# Patient Record
Sex: FEMALE | Race: BLACK OR AFRICAN AMERICAN | Employment: UNEMPLOYED | URBAN - METROPOLITAN AREA
[De-identification: names, ages, dates, MRNs, and addresses within clinical notes are randomized per-mention and may not be internally consistent; named-entity substitution may affect disease eponyms.]

---

## 2021-05-22 ENCOUNTER — APPOINTMENT (OUTPATIENT)
Dept: GENERAL RADIOLOGY | Age: 26
End: 2021-05-22
Attending: EMERGENCY MEDICINE
Payer: COMMERCIAL

## 2021-05-22 ENCOUNTER — APPOINTMENT (OUTPATIENT)
Dept: CT IMAGING | Age: 26
End: 2021-05-22
Attending: EMERGENCY MEDICINE
Payer: COMMERCIAL

## 2021-05-22 ENCOUNTER — HOSPITAL ENCOUNTER (EMERGENCY)
Age: 26
Discharge: HOME | End: 2021-05-22
Attending: EMERGENCY MEDICINE
Payer: COMMERCIAL

## 2021-05-22 VITALS
DIASTOLIC BLOOD PRESSURE: 82 MMHG | SYSTOLIC BLOOD PRESSURE: 138 MMHG | OXYGEN SATURATION: 100 % | RESPIRATION RATE: 15 BRPM | HEART RATE: 91 BPM

## 2021-05-22 DIAGNOSIS — F10.920 ALCOHOLIC INTOXICATION WITHOUT COMPLICATION (HCC): Primary | ICD-10-CM

## 2021-05-22 LAB
ALBUMIN SERPL-MCNC: 3.9 G/DL (ref 3.5–5)
ALBUMIN/GLOB SERPL: 0.8 {RATIO} (ref 1.1–2.2)
ALP SERPL-CCNC: 103 U/L (ref 45–117)
ALT SERPL-CCNC: 31 U/L (ref 12–78)
ANION GAP SERPL CALC-SCNC: 13 MMOL/L (ref 5–15)
AST SERPL-CCNC: 30 U/L (ref 15–37)
BASOPHILS # BLD: 0.1 K/UL (ref 0–0.1)
BASOPHILS NFR BLD: 1 % (ref 0–1)
BILIRUB SERPL-MCNC: 0.4 MG/DL (ref 0.2–1)
BUN SERPL-MCNC: 11 MG/DL (ref 6–20)
BUN/CREAT SERPL: 11 (ref 12–20)
CALCIUM SERPL-MCNC: 8.7 MG/DL (ref 8.5–10.1)
CHLORIDE SERPL-SCNC: 105 MMOL/L (ref 97–108)
CO2 SERPL-SCNC: 23 MMOL/L (ref 21–32)
CREAT SERPL-MCNC: 0.98 MG/DL (ref 0.55–1.02)
DIFFERENTIAL METHOD BLD: ABNORMAL
EOSINOPHIL # BLD: 0.2 K/UL (ref 0–0.4)
EOSINOPHIL NFR BLD: 2 % (ref 0–7)
ERYTHROCYTE [DISTWIDTH] IN BLOOD BY AUTOMATED COUNT: 14.4 % (ref 11.5–14.5)
ETHANOL SERPL-MCNC: 185 MG/DL
GLOBULIN SER CALC-MCNC: 4.8 G/DL (ref 2–4)
GLUCOSE SERPL-MCNC: 140 MG/DL (ref 65–100)
HCG SERPL QL: NEGATIVE
HCT VFR BLD AUTO: 39.2 % (ref 35–47)
HGB BLD-MCNC: 12 G/DL (ref 11.5–16)
IMM GRANULOCYTES # BLD AUTO: 0 K/UL (ref 0–0.04)
IMM GRANULOCYTES NFR BLD AUTO: 0 % (ref 0–0.5)
LYMPHOCYTES # BLD: 4 K/UL (ref 0.8–3.5)
LYMPHOCYTES NFR BLD: 40 % (ref 12–49)
MCH RBC QN AUTO: 22.5 PG (ref 26–34)
MCHC RBC AUTO-ENTMCNC: 30.6 G/DL (ref 30–36.5)
MCV RBC AUTO: 73.5 FL (ref 80–99)
MONOCYTES # BLD: 0.3 K/UL (ref 0–1)
MONOCYTES NFR BLD: 3 % (ref 5–13)
NEUTS SEG # BLD: 5.3 K/UL (ref 1.8–8)
NEUTS SEG NFR BLD: 54 % (ref 32–75)
NRBC # BLD: 0 K/UL (ref 0–0.01)
NRBC BLD-RTO: 0 PER 100 WBC
PLATELET # BLD AUTO: 215 K/UL (ref 150–400)
POTASSIUM SERPL-SCNC: 3.6 MMOL/L (ref 3.5–5.1)
PROT SERPL-MCNC: 8.7 G/DL (ref 6.4–8.2)
RBC # BLD AUTO: 5.33 M/UL (ref 3.8–5.2)
SODIUM SERPL-SCNC: 141 MMOL/L (ref 136–145)
WBC # BLD AUTO: 9.9 K/UL (ref 3.6–11)

## 2021-05-22 PROCEDURE — 84703 CHORIONIC GONADOTROPIN ASSAY: CPT

## 2021-05-22 PROCEDURE — 80053 COMPREHEN METABOLIC PANEL: CPT

## 2021-05-22 PROCEDURE — 85025 COMPLETE CBC W/AUTO DIFF WBC: CPT

## 2021-05-22 PROCEDURE — 82077 ASSAY SPEC XCP UR&BREATH IA: CPT

## 2021-05-22 PROCEDURE — 70450 CT HEAD/BRAIN W/O DYE: CPT

## 2021-05-22 PROCEDURE — 36415 COLL VENOUS BLD VENIPUNCTURE: CPT

## 2021-05-22 PROCEDURE — 99285 EMERGENCY DEPT VISIT HI MDM: CPT

## 2021-05-22 PROCEDURE — 93005 ELECTROCARDIOGRAM TRACING: CPT

## 2021-05-22 PROCEDURE — 71045 X-RAY EXAM CHEST 1 VIEW: CPT

## 2021-05-22 NOTE — ED NOTES
Pt A and O x 3. Pt sitting up in the bed speaking w/ this RN and  at bedside. Pt provided w/ ginger ale and assortment of snacks. Pt testing upright in a position of comfort and in no acute distress. RR even and unlabored. Skin warm and dry. Call bell in reach. Cardiac Monitor x 3.

## 2021-05-22 NOTE — ED PROVIDER NOTES
EMERGENCY DEPARTMENT HISTORY AND PHYSICAL EXAM      Date: 5/22/2021  Patient Name: Jarrod Knight    History of Presenting Illness     Chief Complaint   Patient presents with    Alcohol intoxication       History Provided By: Patient    HPI: Jarrod Knight, 27 y.o. female without known medical history presents to the ED with cc of unresponsiveness and alcohol intoxication. Patient was found in the bathroom of a bar, sister at bedside states that they were bar hopping and that patient went to the bathroom and when she did not return she went to check on her and found her passed out on the floor. EMS called and point-of-care glucose 150. Patient resting in bed with eyes closed, denies pain, will only briefly open eyes. She will not talk to me. She does not her head yes when asked if she was drinking alcohol and shakes her head no when asked if she was in any pain or if she did any other drugs. Otherwise remainder of HPI and ROS limited given patient's severely intoxicated state. There are no other complaints, changes, or physical findings at this time. PCP: None    No current facility-administered medications on file prior to encounter. No current outpatient medications on file prior to encounter. Past History     Past Medical History:  No significant past medical history    Past Surgical History:  No significant past surgical history    Family History:  Reviewed, noncontributory    Social History:  Positive alcohol use, denies drug use    Allergies:  No known drug allergy      Review of Systems   Review of Systems   Unable to perform ROS: Other (Intoxication)       Physical Exam   Physical Exam  Vitals and nursing note reviewed. Constitutional:       General: She is not in acute distress. Appearance: Normal appearance. She is obese. She is not ill-appearing or toxic-appearing.       Comments: Lying in bed in no acute distress, briefly opens eyes to noxious stimuli   HENT:      Head: Normocephalic and atraumatic. Nose: Nose normal.      Mouth/Throat:      Mouth: Mucous membranes are moist.   Eyes:      Extraocular Movements: Extraocular movements intact. Pupils: Pupils are equal, round, and reactive to light. Comments: Occasional disconjugate gaze   Cardiovascular:      Rate and Rhythm: Regular rhythm. Tachycardia present. Heart sounds: No murmur heard. Pulmonary:      Effort: Pulmonary effort is normal. No respiratory distress. Breath sounds: Normal breath sounds. No wheezing. Abdominal:      General: There is no distension. Palpations: Abdomen is soft. Tenderness: There is no abdominal tenderness. There is no guarding or rebound. Musculoskeletal:         General: No swelling or tenderness. Normal range of motion. Cervical back: Normal range of motion and neck supple. Right lower leg: No edema. Left lower leg: No edema. Skin:     General: Skin is warm and dry. Coloration: Skin is not pale. Findings: No erythema. Neurological:      Comments: Briefly opens eyes to noxious stimuli, will nod and shake head yes and no but otherwise does not contribute to history or participate in neuro exam.         Diagnostic Study Results     Labs -     Labs Reviewed   CBC WITH AUTOMATED DIFF - Abnormal; Notable for the following components:       Result Value    RBC 5.33 (*)     MCV 73.5 (*)     MCH 22.5 (*)     MONOCYTES 3 (*)     ABS.  LYMPHOCYTES 4.0 (*)     All other components within normal limits   METABOLIC PANEL, COMPREHENSIVE - Abnormal; Notable for the following components:    Glucose 140 (*)     BUN/Creatinine ratio 11 (*)     Protein, total 8.7 (*)     Globulin 4.8 (*)     A-G Ratio 0.8 (*)     All other components within normal limits   ETHYL ALCOHOL - Abnormal; Notable for the following components:    ALCOHOL(ETHYL),SERUM 185 (*)     All other components within normal limits   HCG QL SERUM   DRUG SCREEN, URINE       Radiologic Studies -   CT HEAD WO CONT   Final Result   No acute intracranial abnormality. XR CHEST PORT   Final Result   No acute cardiopulmonary process        CT Results  (Last 48 hours)               05/22/21 0316  CT HEAD WO CONT Final result    Impression:  No acute intracranial abnormality. Narrative:  EXAM: CT HEAD WO CONT       INDICATION: unresponsive       COMPARISON: None. CONTRAST: None. TECHNIQUE: Unenhanced CT of the head was performed using 5 mm images. Brain and   bone windows were generated. Coronal and sagittal reformats. CT dose reduction   was achieved through use of a standardized protocol tailored for this   examination and automatic exposure control for dose modulation. FINDINGS:   The ventricles and sulci are normal in size, shape and configuration. . There is   no significant white matter disease. There is no intracranial hemorrhage,   extra-axial collection, or mass effect. The basilar cisterns are open. No CT   evidence of acute infarct. The bone windows demonstrate no abnormalities. The visualized portions of the   paranasal sinuses and mastoid air cells are clear. CXR Results  (Last 48 hours)               05/22/21 0232  XR CHEST PORT Final result    Impression:  No acute cardiopulmonary process       Narrative:  EXAM: XR CHEST PORT       INDICATION: unresponsive       COMPARISON: None. FINDINGS: A portable AP radiograph of the chest was obtained at 320 hours. The   patient is on a cardiac monitor. The lungs are clear. The cardiac and   mediastinal contours and pulmonary vascularity are normal.  The bones and soft   tissues are grossly within normal limits. Medical Decision Making   I am the first provider for this patient. I reviewed the vital signs, available nursing notes, past medical history, past surgical history, family history and social history.     Vital Signs-Reviewed the patient's vital signs.  Visit Vitals  /82 (BP 1 Location: Right upper arm, BP Patient Position: At rest)   Pulse 91   Resp 15   LMP  (LMP Unknown)   SpO2 100%       Records Reviewed: Nursing Notes    Provider Notes (Medical Decision Making):   57-year-old female here with unresponsiveness and altered mental status likely with alcohol intoxication after being found in the bathroom of a bar. No sign of head injury or trauma. Vital signs stable. Very difficult to get full history or exam as patient will only briefly open eyes and really does not contribute to history or participate in exam.  She is only able to say that she was drinking alcohol. Will evaluate with CT head, blood alcohol level, UDS, urinalysis, CBC, CMP, and will observe until she metabolizes and becomes more clinically sober. Work-up largely unremarkable including CT head, chest x-ray, blood work. Blood alcohol level 180. Throughout ED stay patient was observed and she did become more sober. She is now able to endorse that she believes she drank too much alcohol, denies any other drug use. She has no complaints of pain or any other complaints here except for feeling tired. She is able to sit up and tolerate p.o. She is able to stand and ambulate throughout the ED.  is now at bedside and able to take her home. Stable for discharge home. ED Course:   Initial assessment performed. The patients presenting problems have been discussed, and they are in agreement with the care plan formulated and outlined with them. I have encouraged them to ask questions as they arise throughout their visit. ED Course as of May 22 1913   Sat May 22, 2021   0220 EKG per my interpretation normal sinus rhythm, rate 93 bpm, normal axis, no acute ischemic changes, no interval changes. [AK]   [id number] On reexamination patient is much more responsive, able to wake up and communicate. States that she only drank alcohol and did not take any other substance. Denies being in any pain or discomfort at this time. She feels like she will be able to eat and drink okay. Will perform a road test, p.o. trial, and anticipate discharge home in company of  who is at bedside.    [AK]      ED Course User Index  [AK] Anoop Thakkar MD         Cardiac Monitoring: The cardiac monitor revealed the following rhythm as interpreted by me: Sinus Tachycardia rate 101 bpm  The cardiac monitor was ordered secondary to the patient's reported complaint of unresponsiveness and to monitor the patient for dysrhythmia. Ben Adamson MD      Discharge Note:  The patient has been re-evaluated and is ready for discharge. Reviewed available results with patient. Counseled patient on diagnosis and care plan. Patient has expressed understanding, and all questions have been answered. Patient agrees with plan and agrees to follow up as recommended, or to return to the ED if their symptoms worsen. Discharge instructions have been provided and explained to the patient, along with reasons to return to the ED. Disposition:  Discharge      DISCHARGE PLAN:  1. There are no discharge medications for this patient. 2.   Follow-up Information     Follow up With Specialties Details Why [id number] Internal Medicine Schedule an appointment as soon as possible for a visit  to establish with a PCP [address] [phone]    [facility] [place] EMERGENCY DEPT Emergency Medicine Go to  As needed, If symptoms worsen [address]        3. Return to ED if worse     Diagnosis     Clinical Impression:   1. Alcoholic intoxication without complication ([address]        Attestations:  I am the first and primary provider of record for this patient's ED encounter. I personally performed the services described above in this documentation.   Ben Adamson MD    Please note that this dictation was completed with Dragon, the computer voice recognition software. Quite often unanticipated grammatical, syntax, homophones, and other interpretive errors are inadvertently transcribed by the computer software. Please disregard these errors. Please excuse any errors that have escaped final proofreading. Thank you.

## 2021-05-22 NOTE — ED NOTES
Pt resting upright on the stretcher in a position of comfort and in no acute distress. RR even and unlabored. Skin warm and dry. Call bell in reach. Cardiac monitor x 3. Lights dimmed for comfort.  at the bedside.

## 2021-05-22 NOTE — ED TRIAGE NOTES
Pt arrives via EMS unresponsive w/ steve gras beads around neck. Per EMS, pt was found in [employer]. When Fire arrived on scene, pt eventually became unresponsive. No obvious signs of traumatic injury. No obvious signs of recreational drug use. Pt appears to be heavily intoxicated. Vomited x 1 PTA. Pt maintaining own airway. Pt arousable to painful stimulus. Per EMS, glucose of 159. Pt able to state first name only. Pt 100% on RA. RR even and unlabored. Skin cool and dry. Cardiac Monitor x 3. Unable to collect a medical hx or initiate proper screening questions d/t unresponsiveness. Pt provided w/ warm blanket. Call bell in reach. Emergency Department Nursing Plan of Care       The Nursing Plan of Care is developed from the Nursing assessment and Emergency Department Attending provider initial evaluation. The plan of care may be reviewed in the ED Provider note.     The Plan of Care was developed with the following considerations:   Patient / Family readiness to learn indicated by:verbalized understanding  Persons(s) to be included in education: patient  Barriers to Learning/Limitations:No    Signed     Bart Hector    5/22/2021   2:01 AM

## 2021-05-22 NOTE — ED NOTES
Pt noted to have urinated on self and soiling bed linen while unresponsive. Pt provided w/ new bed linens, underwear and paper scrubs. Pt ambulatory to restroom to perform personal hygiene.

## 2021-05-22 NOTE — ED NOTES
Pt resting upright on the stretcher in a position of comfort and in no acute distress. Pt remains unresponsive, responding to only painful stimulus. RR even and unlabored. Skin warm and dry. Additional warm blankets provided. Lights dimmed for comfort. Call bell in reach.  at bedside.

## 2021-05-22 NOTE — ED NOTES
Sister in law at bedside confirming that pt and a group of friends were bar hopping and no traumatic injury, fall, or recreational drugs were involved to contribute to pts current condition. Pt currently tearful, minimally responding to sister in law at bedside.

## 2021-05-22 NOTE — DISCHARGE INSTRUCTIONS
You were evaluated in the emergency department for alcohol intoxication. Your examination was reassuring as was your work-up including blood work, chest xray, and CT scan of your head. It will be important for you to follow-up with your primary care physician in 3-7 days for any other complaints. If you develop worsening symptoms such as further passing out episodes, please return to the emergency department immediately. It was a pleasure taking care of you in our Emergency Department today. We know that when you come to [facility], you are entrusting us with your health, comfort, and safety. Our physicians and nurses honor that trust, and truly appreciate the opportunity to care for you and your loved ones. We also value your feedback. If you receive a survey about your Emergency Department experience today, please fill it out. We care about our patients' feedback, and we listen to what you have to say. Thank you!

## 2021-05-22 NOTE — ED NOTES
Patient (s) given copy of dc instructions and 0 script(s). Patient (s) verbalized understanding of instructions and script (s). Patient given a current medication reconciliation form and verbalized understanding of their medications. Patient (s) verbalized understanding of the importance of discussing medications with  his other physician or clinic they will be following up with. Patient alert and oriented and in no acute distress. Patient discharged home ambulatory.

## 2021-05-24 LAB
ATRIAL RATE: 93 BPM
CALCULATED P AXIS, ECG09: 53 DEGREES
CALCULATED R AXIS, ECG10: 17 DEGREES
CALCULATED T AXIS, ECG11: 41 DEGREES
DIAGNOSIS, 93000: NORMAL
P-R INTERVAL, ECG05: 202 MS
Q-T INTERVAL, ECG07: 402 MS
QRS DURATION, ECG06: 84 MS
QTC CALCULATION (BEZET), ECG08: 499 MS
VENTRICULAR RATE, ECG03: 93 BPM

## 2025-07-01 ENCOUNTER — TRANSCRIBE ORDERS (OUTPATIENT)
Facility: HOSPITAL | Age: 30
End: 2025-07-01

## 2025-07-01 DIAGNOSIS — O10.919 CHRONIC HYPERTENSION IN OBSTETRIC CONTEXT, ANTEPARTUM: ICD-10-CM

## 2025-07-01 DIAGNOSIS — O10.012 PRE-EXISTING ESSENTIAL HYPERTENSION COMPLICATING PREGNANCY, SECOND TRIMESTER: Primary | ICD-10-CM
